# Patient Record
Sex: FEMALE | Race: WHITE | NOT HISPANIC OR LATINO | Employment: OTHER | RURAL
[De-identification: names, ages, dates, MRNs, and addresses within clinical notes are randomized per-mention and may not be internally consistent; named-entity substitution may affect disease eponyms.]

---

## 2017-01-15 ENCOUNTER — OFFICE VISIT (OUTPATIENT)
Dept: RETAIL CLINIC | Facility: CLINIC | Age: 52
End: 2017-01-15

## 2017-01-15 VITALS
TEMPERATURE: 96.9 F | HEIGHT: 62 IN | WEIGHT: 142 LBS | BODY MASS INDEX: 26.13 KG/M2 | DIASTOLIC BLOOD PRESSURE: 64 MMHG | SYSTOLIC BLOOD PRESSURE: 124 MMHG | OXYGEN SATURATION: 98 % | HEART RATE: 97 BPM

## 2017-01-15 DIAGNOSIS — H65.193 OTHER ACUTE NONSUPPURATIVE OTITIS MEDIA OF BOTH EARS, RECURRENCE NOT SPECIFIED: Primary | ICD-10-CM

## 2017-01-15 DIAGNOSIS — J01.00 ACUTE MAXILLARY SINUSITIS, RECURRENCE NOT SPECIFIED: ICD-10-CM

## 2017-01-15 PROCEDURE — 99213 OFFICE O/P EST LOW 20 MIN: CPT | Performed by: NURSE PRACTITIONER

## 2017-01-15 RX ORDER — AMOXICILLIN AND CLAVULANATE POTASSIUM 875; 125 MG/1; MG/1
1 TABLET, FILM COATED ORAL 2 TIMES DAILY
Qty: 20 TABLET | Refills: 0 | Status: SHIPPED | OUTPATIENT
Start: 2017-01-15 | End: 2017-01-25

## 2017-01-15 RX ORDER — IBUPROFEN 200 MG
200 TABLET ORAL EVERY 6 HOURS PRN
COMMUNITY

## 2017-01-15 NOTE — PATIENT INSTRUCTIONS
-Discussed dx with Patient  -Medication administration instructions given  -If sx worsen or do not improve seek higher level care  -Patient expressed verbal understanding of plan of care and rationale for interventions.

## 2017-01-15 NOTE — PROGRESS NOTES
"Subjective   Fanta Contreras is a 51 y.o. female. Patient comes into clinic today with concerns regarding:     \"Earache.\"    History of Present Illness  Sx started 3 weeks ago. +feeling hot (started yesterday), fatigued, R sinus congestion, sinus pain, HA, PND, R ear pain.     OTC Medications used:   Ibuprofen, antihistamine pills (unsure of what type).     The following portions of the patient's history were reviewed and updated as appropriate: allergies, current medications, past family history, past medical history, past social history, past surgical history and problem list.    Review of Systems   Constitutional: Positive for fatigue.   HENT: Positive for congestion, ear pain, postnasal drip, sinus pressure and sore throat.    Respiratory: Negative for cough and wheezing.    Cardiovascular: Negative for chest pain.   Gastrointestinal: Positive for nausea. Negative for abdominal pain, diarrhea and vomiting.   Neurological: Positive for headaches.       No Known Allergies    Past Medical History   Diagnosis Date   • Migraines    • Sinusitis        Current Outpatient Prescriptions:   •  ibuprofen (ADVIL,MOTRIN) 200 MG tablet, Take 200 mg by mouth Every 6 (Six) Hours As Needed for mild pain (1-3)., Disp: , Rfl:   •  amoxicillin-clavulanate (AUGMENTIN) 875-125 MG per tablet, Take 1 tablet by mouth 2 (Two) Times a Day for 10 days., Disp: 20 tablet, Rfl: 0    Objective   Physical Exam   Constitutional: She is oriented to person, place, and time. She appears well-developed and well-nourished.   HENT:   Right Ear: External ear and ear canal normal. Tympanic membrane is injected and perforated. A middle ear effusion is present.   Left Ear: External ear and ear canal normal. Tympanic membrane is injected and perforated. A middle ear effusion is present.   Nose: Right sinus exhibits no maxillary sinus tenderness and no frontal sinus tenderness. Left sinus exhibits maxillary sinus tenderness. Left sinus exhibits no frontal " "sinus tenderness.   Mouth/Throat: Uvula is midline. Posterior oropharyngeal erythema present. No oropharyngeal exudate or posterior oropharyngeal edema.   Eyes: Conjunctivae are normal. Pupils are equal, round, and reactive to light.   Neck: Neck supple. No thyromegaly present.   Cardiovascular: Normal rate and regular rhythm.    Pulmonary/Chest: Effort normal and breath sounds normal.   Lymphadenopathy:        Head (right side): Tonsillar adenopathy present. No preauricular and no posterior auricular adenopathy present.        Head (left side): No tonsillar, no preauricular and no posterior auricular adenopathy present.     She has cervical adenopathy.        Right cervical: Superficial cervical adenopathy present.        Left cervical: No superficial cervical adenopathy present.        Right: Supraclavicular adenopathy present.        Left: No supraclavicular adenopathy present.   Neurological: She is alert and oriented to person, place, and time.   CN II-XII grossly intact   Skin: Skin is warm and dry.   Psychiatric: She has a normal mood and affect. Her behavior is normal.   Vitals reviewed.      Visit Vitals   • /64 (BP Location: Left arm, Patient Position: Sitting, Cuff Size: Adult)   • Pulse 97   • Temp 96.9 °F (36.1 °C) (Tympanic)   • Ht 62\" (157.5 cm)   • Wt 142 lb (64.4 kg)   • SpO2 98%   • BMI 25.97 kg/m2       Assessment/Plan   Fanta was seen today for earache.    Diagnoses and all orders for this visit:    Other acute nonsuppurative otitis media of both ears, recurrence not specified  -     amoxicillin-clavulanate (AUGMENTIN) 875-125 MG per tablet; Take 1 tablet by mouth 2 (Two) Times a Day for 10 days.    Acute maxillary sinusitis, recurrence not specified  -     amoxicillin-clavulanate (AUGMENTIN) 875-125 MG per tablet; Take 1 tablet by mouth 2 (Two) Times a Day for 10 days.       -Discussed dx with Patient  -Medication administration instructions given  -If sx worsen or do not improve seek " higher level care  -Patient expressed verbal understanding of plan of care and rationale for interventions.